# Patient Record
Sex: FEMALE | Race: WHITE | NOT HISPANIC OR LATINO | Employment: OTHER | URBAN - METROPOLITAN AREA
[De-identification: names, ages, dates, MRNs, and addresses within clinical notes are randomized per-mention and may not be internally consistent; named-entity substitution may affect disease eponyms.]

---

## 2017-10-24 RX ORDER — ESTRADIOL 0.05 MG/D
1 FILM, EXTENDED RELEASE TRANSDERMAL WEEKLY
Qty: 4 PATCH | Refills: 2 | Status: SHIPPED | OUTPATIENT
Start: 2017-10-24 | End: 2017-11-06 | Stop reason: SDUPTHER

## 2017-10-24 NOTE — TELEPHONE ENCOUNTER
PT LAST PAP WAS 10/18/16. SHE IS SCHEDULED W [facility] 12/14/17 FOR HER YEARLY. CAN SHE HAVE A REFILL ON HER PATCHES UNTIL HER NEXT APPT. SCANNED REFILL REQ. ESTRADIOL 0.05MG PATCH  PLEASE ADVISE  ALIA

## 2017-10-26 ENCOUNTER — TELEPHONE (OUTPATIENT)
Dept: OBSTETRICS AND GYNECOLOGY | Facility: CLINIC | Age: 52
End: 2017-10-26

## 2017-10-26 NOTE — TELEPHONE ENCOUNTER
----- Message from Jeremy Dumont MD sent at 10/25/2017  4:07 PM EDT -----      ----- Message -----     From: Vicki L Severs, RegSched Rep     Sent: 10/25/2017  11:01 AM       To: Jeremy Dumont MD    [facility]

## 2017-11-06 RX ORDER — ESTRADIOL 0.05 MG/D
1 FILM, EXTENDED RELEASE TRANSDERMAL 2 TIMES WEEKLY
Qty: 4 PATCH | Refills: 2 | Status: CANCELLED | OUTPATIENT
Start: 2017-11-06

## 2017-11-06 RX ORDER — ESTRADIOL 0.05 MG/D
1 FILM, EXTENDED RELEASE TRANSDERMAL 2 TIMES WEEKLY
Qty: 8 PATCH | Refills: 10 | Status: SHIPPED | OUTPATIENT
Start: 2017-11-06 | End: 2018-10-14 | Stop reason: SDUPTHER

## 2017-11-06 NOTE — TELEPHONE ENCOUNTER
Patient called stating recent refill was sent to pharmacy for 1x per week and it should be 2x per week. Patient states she and the pharmacy have reached out twice in the last week. Please advise.

## 2017-12-14 ENCOUNTER — OFFICE VISIT (OUTPATIENT)
Dept: OBSTETRICS AND GYNECOLOGY | Facility: CLINIC | Age: 52
End: 2017-12-14

## 2017-12-14 VITALS
DIASTOLIC BLOOD PRESSURE: 64 MMHG | SYSTOLIC BLOOD PRESSURE: 112 MMHG | WEIGHT: 148.8 LBS | BODY MASS INDEX: 29.21 KG/M2 | HEIGHT: 60 IN

## 2017-12-14 DIAGNOSIS — Z01.419 WELL WOMAN EXAM WITH ROUTINE GYNECOLOGICAL EXAM: Primary | ICD-10-CM

## 2017-12-14 PROCEDURE — 99396 PREV VISIT EST AGE 40-64: CPT | Performed by: NURSE PRACTITIONER

## 2017-12-14 RX ORDER — NAPROXEN 500 MG/1
TABLET ORAL
COMMUNITY
Start: 2017-11-06 | End: 2018-09-01

## 2017-12-14 RX ORDER — OMEGA-3-ACID ETHYL ESTERS 1 G/1
CAPSULE, LIQUID FILLED ORAL
COMMUNITY
Start: 2017-12-05 | End: 2021-05-05

## 2017-12-14 RX ORDER — LISINOPRIL 5 MG/1
TABLET ORAL
COMMUNITY
Start: 2017-10-07 | End: 2018-09-01

## 2017-12-14 RX ORDER — LEVOFLOXACIN 250 MG/1
TABLET ORAL
COMMUNITY
Start: 2017-11-24 | End: 2017-12-14

## 2017-12-14 RX ORDER — FLUTICASONE PROPIONATE 50 MCG
SPRAY, SUSPENSION (ML) NASAL
COMMUNITY
Start: 2017-11-24

## 2017-12-14 RX ORDER — LISINOPRIL 2.5 MG/1
TABLET ORAL
COMMUNITY
Start: 2017-12-05 | End: 2017-12-14

## 2017-12-14 RX ORDER — OSELTAMIVIR PHOSPHATE 75 MG/1
CAPSULE ORAL
COMMUNITY
Start: 2017-11-29 | End: 2017-12-14

## 2017-12-14 NOTE — PROGRESS NOTES
[facility]  Routine Annual Visit    2017    Patient: Gisela Zee          MR#:[id number]      History of Present Illness    52 y.o. female  who presents for annual exam. Former Dr. Browne pt. Last annual 10/2016. Last mammogram benign 10/2017. Hx hysterectomy with bso in  for endometriosis. She has been on estrogen replacement therapy and is doing well on minivelle. She desires to continue for another year. Colonoscopy due in 2 years.  She has not been getting paps since her hysterectomy as no history of abnormals and she is aware of this. She has no complaints today.      No LMP recorded. Patient has had a hysterectomy.  Obstetric History:  OB History      Para Term  AB Living    2 2 2   2    SAB TAB Ectopic Multiple Live Births        2         Menstrual History:     No LMP recorded. Patient has had a hysterectomy.       Sexual History:       ________________________________________  There is no problem list on file for this patient.      Past Medical History:   Diagnosis Date   • Endometriosis    • GERD (gastroesophageal reflux disease)    • Hyperlipidemia    • Hypertension        Past Surgical History:   Procedure Laterality Date   •  SECTION     • CHOLECYSTECTOMY     • HERNIA REPAIR     • HYSTERECTOMY         History   Smoking Status   • Never Smoker   Smokeless Tobacco   • Never Used       Family History   Problem Relation Age of Onset   • Hypertension Mother    • Heart disease Mother    • Diabetes Mother    • Cancer Mother    • Cancer Father    • Hypertension Maternal Grandmother    • Heart disease Maternal Grandmother    • Diabetes Maternal Grandmother    • No Known Problems Sister    • No Known Problems Daughter    • No Known Problems Daughter        Prior to Admission medications    Medication Sig Start Date End Date Taking? Authorizing Provider   estradiol (MINIVELLE) 0.05 MG/24HR patch Place 1 patch on the skin 2 (Two) Times a Week. 17  Yes  Jeremy Dumont MD   fexofenadine (ALLEGRA) 60 MG tablet Take 60 mg by mouth Daily.   Yes Historical Provider, MD   fluticasone (FLONASE) 50 MCG/ACT nasal spray  11/24/17  Yes Historical Provider, MD   lisinopril (PRINIVIL,ZESTRIL) 5 MG tablet  10/7/17  Yes Historical Provider, MD   naproxen (NAPROSYN) 500 MG tablet  11/6/17  Yes Historical Provider, MD   omega-3 acid ethyl esters (LOVAZA) 1 g capsule  12/5/17  Yes Historical Provider, MD   rosuvastatin (CRESTOR) 40 MG tablet  9/30/16  Yes Historical Provider, MD   fenofibrate 160 MG tablet  9/30/16 12/14/17  Historical Provider, MD   levoFLOXacin (LEVAQUIN) 250 MG tablet  11/24/17 12/14/17  Historical Provider, MD   lisinopril (PRINIVIL,ZESTRIL) 2.5 MG tablet  12/5/17 12/14/17  Historical Provider, MD   lisinopril (PRINIVIL,ZESTRIL) 20 MG tablet Take 20 mg by mouth Daily.  12/14/17  Historical Provider, MD   oseltamivir (TAMIFLU) 75 MG capsule  11/29/17 12/14/17  Historical Provider, MD   predniSONE (DELTASONE) 20 MG tablet Take 3 tablets po daily x 5 days. 3/17/17 12/14/17  RONNELL Oneil     ________________________________________    Current contraception: status post hysterectomy  History of abnormal Pap smear: no  Family history of uterine or ovarian cancer: no  Family History of colon cancer/colon polyps: no  History of abnormal mammogram: no  History of abnormal lipids: yes - on medication    The following portions of the patient's history were reviewed and updated as appropriate: allergies, current medications, past family history, past medical history, past social history, past surgical history and problem list.    Review of Systems   Constitutional: Negative.    HENT: Negative.    Eyes: Negative for visual disturbance.   Respiratory: Negative for cough, shortness of breath and wheezing.    Cardiovascular: Negative for chest pain, palpitations and leg swelling.   Gastrointestinal: Negative for abdominal distention, abdominal pain, blood in stool,  "constipation, diarrhea, nausea and vomiting.   Endocrine: Negative for cold intolerance and heat intolerance.   Genitourinary: Negative for difficulty urinating, dyspareunia, dysuria, frequency, genital sores, hematuria, menstrual problem, pelvic pain, urgency, vaginal bleeding, vaginal discharge and vaginal pain.   Musculoskeletal: Negative.    Skin: Negative.    Neurological: Negative for dizziness, weakness, light-headedness, numbness and headaches.   Hematological: Negative.    Psychiatric/Behavioral: Negative.    Breasts: negative for lumps skin changes, dimpling, swelling, nipple changes/discharge bilaterally       Objective   Physical Exam    /64  Ht 152.4 cm (60\")  Wt 67.5 kg (148 lb 12.8 oz)  BMI 29.06 kg/m2   BP Readings from Last 3 Encounters:   12/14/17 112/64   03/17/17 99/62   10/18/16 122/80      Wt Readings from Last 3 Encounters:   12/14/17 67.5 kg (148 lb 12.8 oz)   03/17/17 71.7 kg (158 lb)   10/18/16 73 kg (161 lb)        BMI: Estimated body mass index is 29.06 kg/(m^2) as calculated from the following:    Height as of this encounter: 152.4 cm (60\").    Weight as of this encounter: 67.5 kg (148 lb 12.8 oz).      General:   alert, appears stated age and cooperative   Heart: regular rate and rhythm, S1, S2 normal, no murmur, click, rub or gallop   Lungs: clear to auscultation bilaterally   Abdomen: soft, non-tender, without masses or organomegaly   Breast: inspection negative, no nipple discharge or bleeding, no masses or nodularity palpable   Vulva: normal   Vagina: normal mucosa, normal discharge   Cervix: absent   Uterus: absent    Adnexa: no mass, fullness, tenderness     Assessment:    1. Normal annual exam   2. HRT- counseled on risks, benefits, alternatives.  WHI discussed. Suggest lowest dose for shortest duration. Pt understands the risks and desires to continue her therapy.  3. Healthy lifestyle modifications discussed, counseled on self breast exams and bone " health        Plan:    []  Rx:   []  Mammogram request made  []  PAP done  []  Occult fecal blood test (Insure)  []  Labs:   []  GC/Chl/TV  []  DEXA scan   []  Referral for colonoscopy:           RONNELL Britton  12/14/2017 8:54 AM

## 2018-10-15 RX ORDER — ESTRADIOL 0.05 MG/D
FILM, EXTENDED RELEASE TRANSDERMAL
Qty: 8 PATCH | Refills: 3 | Status: SHIPPED | OUTPATIENT
Start: 2018-10-15 | End: 2018-12-20 | Stop reason: SDUPTHER

## 2018-11-15 ENCOUNTER — TELEPHONE (OUTPATIENT)
Dept: OBSTETRICS AND GYNECOLOGY | Age: 53
End: 2018-11-15

## 2018-11-15 NOTE — TELEPHONE ENCOUNTER
----- Message from Jeremy Dumont MD sent at 11/15/2018  9:57 AM EST -----  Call patient: Normal mammogram

## 2018-12-20 ENCOUNTER — OFFICE VISIT (OUTPATIENT)
Dept: OBSTETRICS AND GYNECOLOGY | Age: 53
End: 2018-12-20

## 2018-12-20 VITALS
HEIGHT: 60 IN | SYSTOLIC BLOOD PRESSURE: 122 MMHG | BODY MASS INDEX: 26.39 KG/M2 | WEIGHT: 134.4 LBS | DIASTOLIC BLOOD PRESSURE: 64 MMHG

## 2018-12-20 DIAGNOSIS — Z01.419 WELL WOMAN EXAM WITH ROUTINE GYNECOLOGICAL EXAM: Primary | ICD-10-CM

## 2018-12-20 PROCEDURE — 99396 PREV VISIT EST AGE 40-64: CPT | Performed by: NURSE PRACTITIONER

## 2018-12-20 RX ORDER — ESTRADIOL 0.05 MG/D
1 FILM, EXTENDED RELEASE TRANSDERMAL 2 TIMES WEEKLY
Qty: 8 PATCH | Refills: 12 | Status: SHIPPED | OUTPATIENT
Start: 2018-12-20 | End: 2019-12-23 | Stop reason: SDUPTHER

## 2018-12-20 NOTE — PROGRESS NOTES
[facility]  Routine Annual Visit    2018    Patient: Gisela Zee          MR#:[id number]      History of Present Illness    53 y.o. female  who presents for annual exam. Hx hysterectomy w/bso. Continues on minivelle 0.05 mg. Requesting refill.  Mammogram negative last month. Colonoscopy due next year per pt. On crestor for hyperlipidemia. She reports normal bone density years ago- plans to repeat next year with mammogram. Sees. Dr. Miranda for primary care. No complaints today. Enjoying 3 grandbabies- youngest 2 months old!      No LMP recorded (lmp unknown). Patient has had a hysterectomy.  Obstetric History:  OB History      Para Term  AB Living    2 2 2     2    SAB TAB Ectopic Molar Multiple Live Births              2         Menstrual History:     No LMP recorded (lmp unknown). Patient has had a hysterectomy.       Sexual History:       ________________________________________  Patient Active Problem List   Diagnosis   • Hypertension   • Hypertension   • Hypertension   • Hypertension   • Hypertension       Past Medical History:   Diagnosis Date   • Endometriosis    • GERD (gastroesophageal reflux disease)    • Hyperlipidemia        Past Surgical History:   Procedure Laterality Date   •  SECTION     • CHOLECYSTECTOMY     • HERNIA REPAIR     • HYSTERECTOMY         Social History     Tobacco Use   Smoking Status Never Smoker   Smokeless Tobacco Never Used       Family History   Problem Relation Age of Onset   • Hypertension Mother    • Heart disease Mother    • Diabetes Mother    • Cancer Mother    • Cancer Father    • Hypertension Maternal Grandmother    • Heart disease Maternal Grandmother    • Diabetes Maternal Grandmother    • No Known Problems Sister    • No Known Problems Daughter    • No Known Problems Daughter        Prior to Admission medications    Medication Sig Start Date End Date Taking? Authorizing Provider   estradiol (MINIVELLE, VIVELLE-DOT) 0.05  MG/24HR patch APPLY 1 PATCH EXTERNALLY TO THE SKIN 2 TIMES A WEEK 10/15/18  Yes Polly Murphy APRN   fexofenadine (ALLEGRA) 60 MG tablet Take 60 mg by mouth Daily.   Yes Provider, MD Brunilda   fluticasone (FLONASE) 50 MCG/ACT nasal spray  11/24/17  Yes Provider, Historical, MD   omega-3 acid ethyl esters (LOVAZA) 1 g capsule  12/5/17  Yes Provider, Historical, MD   rosuvastatin (CRESTOR) 40 MG tablet 5 mg. 9/30/16  Yes Provider, Historical, MD   nitrofurantoin, macrocrystal-monohydrate, (MACROBID) 100 MG capsule Take 1 capsule by mouth 2 (Two) Times a Day. 9/1/18   Nichelle Sykes PA-C   phenazopyridine (PYRIDIUM) 200 MG tablet Take 1 tablet by mouth 3 (Three) Times a Day As Needed for bladder spasms. 9/1/18   Nichelle Sykes PA-C     ________________________________________    The following portions of the patient's history were reviewed and updated as appropriate: allergies, current medications, past family history, past medical history, past social history, past surgical history and problem list.    Review of Systems   Constitutional: Negative.    HENT: Negative.    Eyes: Negative for visual disturbance.   Respiratory: Negative for cough, shortness of breath and wheezing.    Cardiovascular: Negative for chest pain, palpitations and leg swelling.   Gastrointestinal: Negative for abdominal distention, abdominal pain, blood in stool, constipation, diarrhea, nausea and vomiting.   Endocrine: Negative for cold intolerance and heat intolerance.   Genitourinary: Negative for difficulty urinating, dyspareunia, dysuria, frequency, genital sores, hematuria, menstrual problem, pelvic pain, urgency, vaginal bleeding, vaginal discharge and vaginal pain.   Musculoskeletal: Negative.    Skin: Negative.    Neurological: Negative for dizziness, weakness, light-headedness, numbness and headaches.   Hematological: Negative.    Psychiatric/Behavioral: Negative.    Breasts: negative for lumps skin changes, dimpling, swelling,  "nipple changes/discharge bilaterally       Objective   Physical Exam    /64   Ht 152.4 cm (60\")   Wt 61 kg (134 lb 6.4 oz)   LMP  (LMP Unknown)   Breastfeeding? No   BMI 26.25 kg/m²    BP Readings from Last 3 Encounters:   12/20/18 122/64   09/01/18 118/76   06/09/18 124/81      Wt Readings from Last 3 Encounters:   12/20/18 61 kg (134 lb 6.4 oz)   09/01/18 61.7 kg (136 lb)   06/09/18 61.7 kg (136 lb)        BMI: Estimated body mass index is 26.25 kg/m² as calculated from the following:    Height as of this encounter: 152.4 cm (60\").    Weight as of this encounter: 61 kg (134 lb 6.4 oz).            General:   alert, appears stated age and cooperative   Heart: regular rate and rhythm, S1, S2 normal, no murmur, click, rub or gallop   Lungs: clear to auscultation bilaterally   Abdomen: soft, non-tender, without masses or organomegaly   Breast: inspection negative, no nipple discharge or bleeding, no masses or nodularity palpable   Vulva: normal   Vagina: normal mucosa, normal discharge   Cervix: absent   Uterus: absent    Adnexa: no mass, fullness, tenderness     Assessment:    1. Normal annual exam   2. HRT- counseled on risks, benefits, alternatives. Patient has been made aware of the black box warming from FDA about heart attacks, strokes, breast cancer, uterine cancer, and VTE risks. WHI discussed. Suggest lowest dose for shortest duration. Pt understands the risks and desires to continue her therapy.  3. Healthy lifestyle modifications discussed, counseled on self breast exams and bone health      Plan:    []  Rx:   []  Mammogram request made  []  PAP done  []  Occult fecal blood test (Insure)  []  Labs:   []  GC/Chl/TV  []  DEXA scan   []  Referral for colonoscopy:           RONNELL Britton  12/20/2018 11:37 AM  "

## 2019-11-20 ENCOUNTER — TELEPHONE (OUTPATIENT)
Dept: OBSTETRICS AND GYNECOLOGY | Age: 54
End: 2019-11-20

## 2019-11-20 NOTE — TELEPHONE ENCOUNTER
----- Message from Jeremy Dumont MD sent at 11/20/2019  2:31 PM EST -----  Call patient: Normal mammogram

## 2019-12-23 ENCOUNTER — OFFICE VISIT (OUTPATIENT)
Dept: OBSTETRICS AND GYNECOLOGY | Age: 54
End: 2019-12-23

## 2019-12-23 VITALS
SYSTOLIC BLOOD PRESSURE: 110 MMHG | WEIGHT: 138 LBS | HEIGHT: 60 IN | BODY MASS INDEX: 27.09 KG/M2 | DIASTOLIC BLOOD PRESSURE: 70 MMHG

## 2019-12-23 DIAGNOSIS — Z01.419 WELL WOMAN EXAM WITH ROUTINE GYNECOLOGICAL EXAM: Primary | ICD-10-CM

## 2019-12-23 PROCEDURE — 99396 PREV VISIT EST AGE 40-64: CPT | Performed by: NURSE PRACTITIONER

## 2019-12-23 RX ORDER — ESTRADIOL 0.05 MG/D
1 FILM, EXTENDED RELEASE TRANSDERMAL 2 TIMES WEEKLY
Qty: 8 PATCH | Refills: 12 | Status: SHIPPED | OUTPATIENT
Start: 2019-12-23 | End: 2020-01-02

## 2019-12-23 RX ORDER — ROSUVASTATIN CALCIUM 5 MG/1
10 TABLET, COATED ORAL DAILY
COMMUNITY

## 2019-12-23 NOTE — PROGRESS NOTES
Subjective     Chief Complaint   Patient presents with   • Gynecologic Exam     Annual exam. MG last month at [facility].       History of Present Illness    Gisela Zee is a 54 y.o. female who presents for annual exam. Mammogram negative 2019. Reports current on colonoscopy- repeat due next year she reports. DEXA completed in October- ordered and managed by her PCP, Dr. Miranda. Normal Gisela reports.     Gisela denies any changes or concerns today. Continues on minivelle and requests refill.    Obstetric History:  OB History        2    Para   2    Term   2            AB        Living   2       SAB        TAB        Ectopic        Molar        Multiple        Live Births   2               Menstrual History:     No LMP recorded (lmp unknown). Patient has had a hysterectomy.         Mammogram: up to date.  Colonoscopy: up to date.  DEXA: up to date.  Last Pap:    Social History    Tobacco Use      Smoking status: Never Smoker      Smokeless tobacco: Never Used    Exercise: moderately active  Calcium/Vitamin D: adequate intake    The following portions of the patient's history were reviewed and updated as appropriate: allergies, current medications, past family history, past medical history, past social history, past surgical history and problem list.    Review of Systems   Constitutional: Negative.    HENT: Negative.    Eyes: Negative for visual disturbance.   Respiratory: Negative for cough, shortness of breath and wheezing.    Cardiovascular: Negative for chest pain, palpitations and leg swelling.   Gastrointestinal: Negative for abdominal distention, abdominal pain, blood in stool, constipation, diarrhea, nausea and vomiting.   Endocrine: Negative for cold intolerance and heat intolerance.   Genitourinary: Negative for difficulty urinating, dyspareunia, dysuria, frequency, genital sores, hematuria, menstrual problem, pelvic pain, urgency, vaginal bleeding, vaginal discharge and vaginal pain.  "  Musculoskeletal: Negative.    Skin: Negative.    Neurological: Negative for dizziness, weakness, light-headedness, numbness and headaches.   Hematological: Negative.    Psychiatric/Behavioral: Negative.    Breasts: negative for lumps skin changes, dimpling, swelling, nipple changes/discharge bilaterally       Objective   Physical Exam    /70   Ht 152.4 cm (60\")   Wt 62.6 kg (138 lb)   LMP  (LMP Unknown)   BMI 26.95 kg/m²     General:   alert, appears stated age and cooperative   Neck:    Heart: regular rate and rhythm, S1, S2 normal, no murmur, click, rub or gallop   Lungs: clear to auscultation bilaterally   Abdomen: soft, non-tender, without masses or organomegaly   Breast: inspection negative, no nipple discharge or bleeding, no masses or nodularity palpable   Vulva: normal   Vagina: normal mucosa, normal discharge   Cervix: absent   Uterus: absent   Adnexa: no mass, fullness, tenderness         Assessment/Plan   Gisela was seen today for gynecologic exam.    Diagnoses and all orders for this visit:    Well woman exam with routine gynecological exam        All questions answered.  Breast self exam technique reviewed and patient encouraged to perform self-exam monthly.  Discussed healthy lifestyle modifications.   All of the patient's questions were addressed and answered, I have encouraged her to call for today's test results if she has not received them within 10 days.  Patient is advised to call with any change in her condition or with any other questions, otherwise return in 12 months for annual examination.      Polly Murphy, RONNELL             "

## 2020-01-02 RX ORDER — ESTRADIOL 0.05 MG/D
FILM, EXTENDED RELEASE TRANSDERMAL
Qty: 8 PATCH | Refills: 12 | Status: SHIPPED | OUTPATIENT
Start: 2020-01-02 | End: 2021-01-11 | Stop reason: SDUPTHER

## 2020-09-24 ENCOUNTER — TELEPHONE (OUTPATIENT)
Dept: GASTROENTEROLOGY | Facility: CLINIC | Age: 55
End: 2020-09-24

## 2020-09-25 ENCOUNTER — PREP FOR SURGERY (OUTPATIENT)
Dept: OTHER | Facility: HOSPITAL | Age: 55
End: 2020-09-25

## 2020-09-25 DIAGNOSIS — Z12.11 ENCOUNTER FOR SCREENING FOR MALIGNANT NEOPLASM OF COLON: Primary | ICD-10-CM

## 2020-09-30 NOTE — TELEPHONE ENCOUNTER
CALLED AND SPOKE WITH PATIENT.  SCHEDULED 12/18/2020 AT 2PM - ARRIVE 1PM.  E-MAIL INSTRUCTIONS [email or web address].    COVID TEST ON 12/16/2020 ARRIVE 9AM AT [facility].  NEED TO SELF QUARANTINE  UNTIL AFTER PROCEDURE.  SHE UNDERSTANDS.

## 2020-12-02 ENCOUNTER — TELEPHONE (OUTPATIENT)
Dept: GASTROENTEROLOGY | Facility: CLINIC | Age: 55
End: 2020-12-02

## 2020-12-02 NOTE — TELEPHONE ENCOUNTER
CALL FROM PATIENT.  SCHEDULED FOR COLONOSCOPY 12/18/2020, NEEDS TO RESCHEDULE.   IS HAVING BACK SURGERY.  RESCHEDULE TO [facility] 03/05/2021 AT 12:30PM - ARRIVE 11:30AM.  E-MAIL INSTRUCTIONS [email or web address].    COVID TEST ON 03/03/2021 ARRIVE 8AM AT [place].  NEED TO SELF QUARANTINE UNTIL AFTER PROCEDURE.  SHE UNDERSTANDS.

## 2021-01-11 ENCOUNTER — OFFICE VISIT (OUTPATIENT)
Dept: OBSTETRICS AND GYNECOLOGY | Age: 56
End: 2021-01-11

## 2021-01-11 VITALS
BODY MASS INDEX: 23.73 KG/M2 | SYSTOLIC BLOOD PRESSURE: 124 MMHG | HEIGHT: 64 IN | WEIGHT: 139 LBS | DIASTOLIC BLOOD PRESSURE: 76 MMHG

## 2021-01-11 DIAGNOSIS — Z01.419 WELL WOMAN EXAM WITH ROUTINE GYNECOLOGICAL EXAM: Primary | ICD-10-CM

## 2021-01-11 DIAGNOSIS — Z12.31 SCREENING MAMMOGRAM, ENCOUNTER FOR: ICD-10-CM

## 2021-01-11 PROCEDURE — 99396 PREV VISIT EST AGE 40-64: CPT | Performed by: NURSE PRACTITIONER

## 2021-01-11 RX ORDER — ESTRADIOL 0.05 MG/D
1 FILM, EXTENDED RELEASE TRANSDERMAL 2 TIMES WEEKLY
Qty: 8 PATCH | Refills: 12 | Status: SHIPPED | OUTPATIENT
Start: 2021-01-11 | End: 2022-01-17 | Stop reason: SDUPTHER

## 2021-01-11 RX ORDER — MONTELUKAST SODIUM 10 MG/1
TABLET ORAL
COMMUNITY
Start: 2020-04-01 | End: 2023-01-19

## 2021-01-11 RX ORDER — MULTIPLE VITAMINS W/ MINERALS TAB 9MG-400MCG
TAB ORAL
COMMUNITY
Start: 2017-01-01

## 2021-01-11 NOTE — PROGRESS NOTES
[facility]  Routine Annual Visit    2021    Patient: Gisela Zee          MR#:[id number]      History of Present Illness    55 y.o. female  who presents for annual exam.    Hx hysterectomy  Mammogram negative 2019  Colonoscopy scheduled in 2 months  She continues on low dose minivelle for vasomotor sxs - desires to continue therapy   DEXA- reports current and normal. Ordered by Dr. Miranda    No complaints today  Home schooling 3 grandsons during COVID    No LMP recorded (lmp unknown). Patient has had a hysterectomy.  Obstetric History:  OB History        2    Para   2    Term   2            AB        Living   2       SAB        TAB        Ectopic        Molar        Multiple        Live Births   2               Menstrual History:     No LMP recorded (lmp unknown). Patient has had a hysterectomy.       Sexual History:       ________________________________________  Patient Active Problem List   Diagnosis   (none) - all problems resolved or deleted       Past Medical History:   Diagnosis Date   • Endometriosis    • GERD (gastroesophageal reflux disease)    • Hyperlipidemia    • Hypertension        Past Surgical History:   Procedure Laterality Date   •  SECTION     • CHOLECYSTECTOMY     • COLON RESECTION      diverticulitis    • HERNIA REPAIR     • HYSTERECTOMY         Social History     Tobacco Use   Smoking Status Never Smoker   Smokeless Tobacco Never Used       Family History   Problem Relation Age of Onset   • Hypertension Mother    • Heart disease Mother    • Diabetes Mother    • Cancer Mother    • Cancer Father    • Hypertension Maternal Grandmother    • Heart disease Maternal Grandmother    • Diabetes Maternal Grandmother    • Multiple sclerosis Sister    • No Known Problems Daughter    • No Known Problems Daughter        Prior to Admission medications    Medication Sig Start Date End Date Taking? Authorizing Provider   ALBUTEROL SULFATE HFA IN   10/9/20  Yes Provider, MD Brunilda   estradiol (MINIVELLE, VIVELLE-DOT) 0.05 MG/24HR patch APPLY 1 PATCH TO SKIN TWICE A WEEK 1/2/20  Yes Polly Murphy APRN   fexofenadine (ALLEGRA) 60 MG tablet Take 60 mg by mouth Daily.   Yes Provider, MD Brunilda   fluticasone (FLONASE) 50 MCG/ACT nasal spray  11/24/17  Yes ProviderBrunilda MD   montelukast (SINGULAIR) 10 MG tablet  4/1/20  Yes Provider, MD Brunilda   multivitamin with minerals (Multivitamin Adults 50+) tablet tablet  1/1/17  Yes Provider, MD Brunilda   omega-3 acid ethyl esters (LOVAZA) 1 g capsule  12/5/17  Yes Provider, MD Brunilda   rosuvastatin (CRESTOR) 5 MG tablet Take 5 mg by mouth Daily.   Yes Provider, MD Brunilda   ciprofloxacin (Cipro) 500 MG tablet Take 1 tablet by mouth 2 (Two) Times a Day. 9/24/20   Nichelle Sykes PA-C   metroNIDAZOLE (FLAGYL) 500 MG tablet Take 1 tablet by mouth 3 (Three) Times a Day. 9/24/20   Nichelle Sykes PA-C     ________________________________________    The following portions of the patient's history were reviewed and updated as appropriate: allergies, current medications, past family history, past medical history, past social history, past surgical history and problem list.    Review of Systems   Constitutional: Negative.    HENT: Negative.    Eyes: Negative for visual disturbance.   Respiratory: Negative for cough, shortness of breath and wheezing.    Cardiovascular: Negative for chest pain, palpitations and leg swelling.   Gastrointestinal: Negative for abdominal distention, abdominal pain, blood in stool, constipation, diarrhea, nausea and vomiting.   Endocrine: Negative for cold intolerance and heat intolerance.   Genitourinary: Negative for difficulty urinating, dyspareunia, dysuria, frequency, genital sores, hematuria, menstrual problem, pelvic pain, urgency, vaginal bleeding, vaginal discharge and vaginal pain.   Musculoskeletal: Negative.    Skin: Negative.    Neurological: Negative for  "dizziness, weakness, light-headedness, numbness and headaches.   Hematological: Negative.    Psychiatric/Behavioral: Negative.    Breasts: negative for lumps skin changes, dimpling, swelling, nipple changes/discharge bilaterally           Objective   Physical Exam    /76   Ht 162.6 cm (64\")   Wt 63 kg (139 lb)   LMP  (LMP Unknown)   Breastfeeding No   BMI 23.86 kg/m²    BP Readings from Last 3 Encounters:   01/11/21 124/76   09/24/20 139/90   12/23/19 110/70      Wt Readings from Last 3 Encounters:   01/11/21 63 kg (139 lb)   09/24/20 61.2 kg (135 lb)   12/23/19 62.6 kg (138 lb)        BMI: Estimated body mass index is 23.86 kg/m² as calculated from the following:    Height as of this encounter: 162.6 cm (64\").    Weight as of this encounter: 63 kg (139 lb).            General:   alert, appears stated age and cooperative   Heart: regular rate and rhythm, S1, S2 normal, no murmur, click, rub or gallop   Lungs: clear to auscultation bilaterally   Abdomen: soft, non-tender, without masses or organomegaly   Breast: inspection negative, no nipple discharge or bleeding, no masses or nodularity palpable   Vulva: External genitalia including bartholin's glands, Urethra, [place]'s gland and urethra meatus are normal, Perineum, rectum and anus appear normal  and Bladder appears normal without significant prolapse    Vagina: normal mucosa, normal discharge   Cervix: absent   Uterus: absent    Adnexa: no mass, fullness, tenderness     Assessment:    1. Normal annual exam   2. HRT - Patient has been made aware of the black box warming from FDA about heart attacks, strokes, breast cancer, uterine cancer, and VTE risks  3. Healthy lifestyle modifications discussed, counseled on self breast exams and bone health    All of the patient's questions were addressed and answered, I have encouraged her to call for today's test results if she has not received them within 10 days.  Patient is advised to call with any change in her " condition or with any other questions, otherwise return in 12 months for annual examination.      Plan:    []  Rx:   [x]  Mammogram request made  [x]  PAP done  []  Occult fecal blood test (Insure)  []  Labs:   []  GC/Chl/TV  []  DEXA scan   []  Referral for colonoscopy:           Polly Murphy, RONNELL  1/11/2021 09:27 EST

## 2021-01-14 LAB
CYTOLOGIST CVX/VAG CYTO: NORMAL
CYTOLOGY CVX/VAG DOC CYTO: NORMAL
CYTOLOGY CVX/VAG DOC THIN PREP: NORMAL
DX ICD CODE: NORMAL
HIV 1 & 2 AB SER-IMP: NORMAL
HPV I/H RISK 4 DNA CVX QL PROBE+SIG AMP: NEGATIVE
Lab: NORMAL
OTHER STN SPEC: NORMAL
STAT OF ADQ CVX/VAG CYTO-IMP: NORMAL

## 2021-01-28 ENCOUNTER — TRANSCRIBE ORDERS (OUTPATIENT)
Dept: LAB | Facility: SURGERY CENTER | Age: 56
End: 2021-01-28

## 2021-01-28 DIAGNOSIS — Z01.818 OTHER SPECIFIED PRE-OPERATIVE EXAMINATION: Primary | ICD-10-CM

## 2021-02-08 ENCOUNTER — TELEPHONE (OUTPATIENT)
Dept: GASTROENTEROLOGY | Facility: CLINIC | Age: 56
End: 2021-02-08

## 2021-02-08 NOTE — TELEPHONE ENCOUNTER
CALL FROM PATIENT.  SCHEDULED FOR COLONOSCOPY ON 03/05/2021.   WILL BE HAVING SURGERY AND NEED TO RESCHEDULE HER PROCEDURE.    SCHEDULED AT [facility] 05/07/2021 AT 3PM - ARRIVE 2PM.  E-MAIL [email or web address].    COVID TEST ON 05/05/2021 ARR[facility] 8AM AT [facility].  NEED TO SELF QUARANTINE UNTIL AFTER PROCEDURE.  SHE UNDERSTANDS.

## 2021-03-03 ENCOUNTER — APPOINTMENT (OUTPATIENT)
Dept: LAB | Facility: SURGERY CENTER | Age: 56
End: 2021-03-03

## 2021-03-15 ENCOUNTER — HOSPITAL ENCOUNTER (OUTPATIENT)
Dept: MAMMOGRAPHY | Facility: HOSPITAL | Age: 56
Discharge: HOME | End: 2021-03-15
Admitting: NURSE PRACTITIONER

## 2021-03-15 DIAGNOSIS — Z12.31 SCREENING MAMMOGRAM, ENCOUNTER FOR: ICD-10-CM

## 2021-03-15 PROCEDURE — 77063 BREAST TOMOSYNTHESIS BI: CPT

## 2021-03-15 PROCEDURE — 77067 SCR MAMMO BI INCL CAD: CPT

## 2021-03-30 ENCOUNTER — BULK ORDERING (OUTPATIENT)
Dept: CASE MANAGEMENT | Facility: OTHER | Age: 56
End: 2021-03-30

## 2021-03-30 DIAGNOSIS — Z23 IMMUNIZATION DUE: ICD-10-CM

## 2021-04-09 ENCOUNTER — PREP FOR SURGERY (OUTPATIENT)
Dept: SURGERY | Facility: SURGERY CENTER | Age: 56
End: 2021-04-09

## 2021-04-09 DIAGNOSIS — Z12.11 ENCOUNTER FOR SCREENING FOR MALIGNANT NEOPLASM OF COLON: Primary | ICD-10-CM

## 2021-05-05 ENCOUNTER — LAB (OUTPATIENT)
Dept: LAB | Facility: SURGERY CENTER | Age: 56
End: 2021-05-05

## 2021-05-05 ENCOUNTER — TRANSCRIBE ORDERS (OUTPATIENT)
Dept: LAB | Facility: SURGERY CENTER | Age: 56
End: 2021-05-05

## 2021-05-05 DIAGNOSIS — Z01.818 OTHER SPECIFIED PRE-OPERATIVE EXAMINATION: Primary | ICD-10-CM

## 2021-05-05 DIAGNOSIS — Z01.818 OTHER SPECIFIED PRE-OPERATIVE EXAMINATION: ICD-10-CM

## 2021-05-05 LAB — SARS-COV-2 ORF1AB RESP QL NAA+PROBE: NOT DETECTED

## 2021-05-05 PROCEDURE — C9803 HOPD COVID-19 SPEC COLLECT: HCPCS

## 2021-05-05 PROCEDURE — U0004 COV-19 TEST NON-CDC HGH THRU: HCPCS | Performed by: SURGERY

## 2021-05-07 ENCOUNTER — HOSPITAL ENCOUNTER (OUTPATIENT)
Facility: SURGERY CENTER | Age: 56
Setting detail: HOSPITAL OUTPATIENT SURGERY
Discharge: HOME | End: 2021-05-07
Attending: INTERNAL MEDICINE | Admitting: INTERNAL MEDICINE

## 2021-05-07 ENCOUNTER — ANESTHESIA (OUTPATIENT)
Dept: SURGERY | Facility: SURGERY CENTER | Age: 56
End: 2021-05-07

## 2021-05-07 ENCOUNTER — ANESTHESIA EVENT (OUTPATIENT)
Dept: SURGERY | Facility: SURGERY CENTER | Age: 56
End: 2021-05-07

## 2021-05-07 VITALS
HEIGHT: 60 IN | BODY MASS INDEX: 26.88 KG/M2 | WEIGHT: 136.9 LBS | OXYGEN SATURATION: 98 % | SYSTOLIC BLOOD PRESSURE: 108 MMHG | DIASTOLIC BLOOD PRESSURE: 67 MMHG | HEART RATE: 84 BPM | TEMPERATURE: 97.1 F | RESPIRATION RATE: 20 BRPM

## 2021-05-07 DIAGNOSIS — Z12.11 ENCOUNTER FOR SCREENING FOR MALIGNANT NEOPLASM OF COLON: ICD-10-CM

## 2021-05-07 PROCEDURE — 45380 COLONOSCOPY AND BIOPSY: CPT | Performed by: INTERNAL MEDICINE

## 2021-05-07 PROCEDURE — 25010000002 PROPOFOL 10 MG/ML EMULSION: Performed by: ANESTHESIOLOGY

## 2021-05-07 PROCEDURE — 0DBK8ZX EXCISION OF ASCENDING COLON, VIA NATURAL OR ARTIFICIAL OPENING ENDOSCOPIC, DIAGNOSTIC: ICD-10-PCS | Performed by: INTERNAL MEDICINE

## 2021-05-07 PROCEDURE — 0DBL8ZX EXCISION OF TRANSVERSE COLON, VIA NATURAL OR ARTIFICIAL OPENING ENDOSCOPIC, DIAGNOSTIC: ICD-10-PCS | Performed by: INTERNAL MEDICINE

## 2021-05-07 PROCEDURE — 88305 TISSUE EXAM BY PATHOLOGIST: CPT | Performed by: INTERNAL MEDICINE

## 2021-05-07 RX ORDER — MAGNESIUM HYDROXIDE 1200 MG/15ML
LIQUID ORAL AS NEEDED
Status: DISCONTINUED | OUTPATIENT
Start: 2021-05-07 | End: 2021-05-07 | Stop reason: HOSPADM

## 2021-05-07 RX ORDER — PROPOFOL 10 MG/ML
VIAL (ML) INTRAVENOUS AS NEEDED
Status: DISCONTINUED | OUTPATIENT
Start: 2021-05-07 | End: 2021-05-07 | Stop reason: SURG

## 2021-05-07 RX ORDER — SODIUM CHLORIDE 0.9 % (FLUSH) 0.9 %
10 SYRINGE (ML) INJECTION AS NEEDED
Status: DISCONTINUED | OUTPATIENT
Start: 2021-05-07 | End: 2021-05-07 | Stop reason: HOSPADM

## 2021-05-07 RX ORDER — SODIUM CHLORIDE, SODIUM LACTATE, POTASSIUM CHLORIDE, CALCIUM CHLORIDE 600; 310; 30; 20 MG/100ML; MG/100ML; MG/100ML; MG/100ML
1000 INJECTION, SOLUTION INTRAVENOUS CONTINUOUS
Status: DISCONTINUED | OUTPATIENT
Start: 2021-05-07 | End: 2021-05-07 | Stop reason: HOSPADM

## 2021-05-07 RX ORDER — SODIUM CHLORIDE, SODIUM LACTATE, POTASSIUM CHLORIDE, CALCIUM CHLORIDE 600; 310; 30; 20 MG/100ML; MG/100ML; MG/100ML; MG/100ML
INJECTION, SOLUTION INTRAVENOUS CONTINUOUS PRN
Status: DISCONTINUED | OUTPATIENT
Start: 2021-05-07 | End: 2021-05-07 | Stop reason: SURG

## 2021-05-07 RX ORDER — LIDOCAINE HYDROCHLORIDE 20 MG/ML
INJECTION, SOLUTION INFILTRATION; PERINEURAL AS NEEDED
Status: DISCONTINUED | OUTPATIENT
Start: 2021-05-07 | End: 2021-05-07 | Stop reason: SURG

## 2021-05-07 RX ORDER — LIDOCAINE HYDROCHLORIDE 10 MG/ML
0.5 INJECTION, SOLUTION INFILTRATION; PERINEURAL ONCE AS NEEDED
Status: DISCONTINUED | OUTPATIENT
Start: 2021-05-07 | End: 2021-05-07 | Stop reason: HOSPADM

## 2021-05-07 RX ADMIN — PROPOFOL 80 MG: 10 INJECTION, EMULSION INTRAVENOUS at 14:35

## 2021-05-07 RX ADMIN — SODIUM CHLORIDE, POTASSIUM CHLORIDE, SODIUM LACTATE AND CALCIUM CHLORIDE 1000 ML: 600; 310; 30; 20 INJECTION, SOLUTION INTRAVENOUS at 13:18

## 2021-05-07 RX ADMIN — SODIUM CHLORIDE, SODIUM LACTATE, POTASSIUM CHLORIDE, AND CALCIUM CHLORIDE: .6; .31; .03; .02 INJECTION, SOLUTION INTRAVENOUS at 14:30

## 2021-05-07 RX ADMIN — LIDOCAINE HYDROCHLORIDE 60 MG: 20 INJECTION, SOLUTION INFILTRATION; PERINEURAL at 14:33

## 2021-05-07 RX ADMIN — PROPOFOL 160 MCG/KG/MIN: 10 INJECTION, EMULSION INTRAVENOUS at 14:35

## 2021-05-07 NOTE — ANESTHESIA POSTPROCEDURE EVALUATION
"Patient: Gisela Zee    Procedure Summary     Date: 05/07/21 Room / Location: [facility]    Anesthesia Start: 1430 Anesthesia Stop: 1501    Procedure: COLONOSCOPY WITH DIVERTICULOSIS AND POLYPS (N/A ) Diagnosis:       Encounter for screening for malignant neoplasm of colon      Colon polyp      Diverticulosis large intestine w/o perforation or abscess w/bleeding      (Encounter for screening for malignant neoplasm of colon [Z12.11])    Surgeons: Enrique Lopez MD Provider: Carol Hairston MD    Anesthesia Type: MAC ASA Status: 2          Anesthesia Type: MAC    Vitals  Vitals Value Taken Time   BP 97/55 05/07/21 1503   Temp 36.2 °C (97.1 °F) 05/07/21 1501   Pulse 72 05/07/21 1503   Resp 18 05/07/21 1503   SpO2 95 % 05/07/21 1503           Post Anesthesia Care and Evaluation    Patient location during evaluation: bedside  Patient participation: complete - patient participated  Level of consciousness: awake  Pain management: adequate  Airway patency: patent  Anesthetic complications: No anesthetic complications    Cardiovascular status: acceptable  Respiratory status: acceptable  Hydration status: acceptable    Comments: BP 97/55 (BP Location: Left arm, Patient Position: Lying)   Pulse 72   Temp 36.2 °C (97.1 °F) (Temporal)   Resp 18   Ht 152.4 cm (60\")   Wt 62.1 kg (136 lb 14.4 oz)   LMP  (LMP Unknown)   SpO2 95%   BMI 26.74 kg/m²       "

## 2021-05-07 NOTE — ANESTHESIA PREPROCEDURE EVALUATION
Anesthesia Evaluation     NPO Solid Status: > 8 hours  NPO Liquid Status: > 2 hours           Airway   Mallampati: II  TM distance: >3 FB  Neck ROM: full  No difficulty expected  Dental - normal exam     Pulmonary - normal exam   (+) asthma,    ROS comment: Seasonal allergies   Cardiovascular - normal exam  Exercise tolerance: good (4-7 METS)    (+) hypertension well controlled less than 2 medications, hyperlipidemia,       Neuro/Psych  GI/Hepatic/Renal/Endo    (+)  GERD well controlled,      Musculoskeletal     Abdominal    Substance History      OB/GYN          Other                      Anesthesia Plan    ASA 2     MAC     intravenous induction     Anesthetic plan, all risks, benefits, and alternatives have been provided, discussed and informed consent has been obtained with: patient.

## 2021-05-10 LAB
CYTO UR: NORMAL
LAB AP CASE REPORT: NORMAL
LAB AP CLINICAL INFORMATION: NORMAL
PATH REPORT.FINAL DX SPEC: NORMAL
PATH REPORT.GROSS SPEC: NORMAL

## 2022-01-17 ENCOUNTER — OFFICE VISIT (OUTPATIENT)
Dept: OBSTETRICS AND GYNECOLOGY | Age: 57
End: 2022-01-17

## 2022-01-17 VITALS
WEIGHT: 141.4 LBS | BODY MASS INDEX: 27.76 KG/M2 | DIASTOLIC BLOOD PRESSURE: 76 MMHG | HEIGHT: 60 IN | SYSTOLIC BLOOD PRESSURE: 118 MMHG

## 2022-01-17 DIAGNOSIS — Z12.31 SCREENING MAMMOGRAM FOR BREAST CANCER: ICD-10-CM

## 2022-01-17 DIAGNOSIS — Z01.419 WELL WOMAN EXAM WITH ROUTINE GYNECOLOGICAL EXAM: Primary | ICD-10-CM

## 2022-01-17 PROCEDURE — 99396 PREV VISIT EST AGE 40-64: CPT | Performed by: NURSE PRACTITIONER

## 2022-01-17 RX ORDER — ELETRIPTAN HYDROBROMIDE 20 MG/1
TABLET, FILM COATED ORAL
COMMUNITY

## 2022-01-17 RX ORDER — LISINOPRIL 2.5 MG/1
TABLET ORAL
COMMUNITY
Start: 2021-09-24 | End: 2023-01-19

## 2022-01-17 RX ORDER — ESTRADIOL 0.05 MG/D
1 FILM, EXTENDED RELEASE TRANSDERMAL 2 TIMES WEEKLY
Qty: 8 PATCH | Refills: 12 | Status: SHIPPED | OUTPATIENT
Start: 2022-01-17 | End: 2023-01-19 | Stop reason: SDUPTHER

## 2022-01-19 LAB
CYTOLOGIST CVX/VAG CYTO: NORMAL
CYTOLOGY CVX/VAG DOC CYTO: NORMAL
CYTOLOGY CVX/VAG DOC THIN PREP: NORMAL
DX ICD CODE: NORMAL
HIV 1 & 2 AB SER-IMP: NORMAL
HPV I/H RISK 4 DNA CVX QL PROBE+SIG AMP: NEGATIVE
OTHER STN SPEC: NORMAL
STAT OF ADQ CVX/VAG CYTO-IMP: NORMAL

## 2022-03-18 ENCOUNTER — HOSPITAL ENCOUNTER (OUTPATIENT)
Dept: MAMMOGRAPHY | Facility: HOSPITAL | Age: 57
Discharge: HOME | End: 2022-03-18
Admitting: NURSE PRACTITIONER

## 2022-03-18 DIAGNOSIS — Z12.31 SCREENING MAMMOGRAM FOR BREAST CANCER: ICD-10-CM

## 2022-03-18 PROCEDURE — 77067 SCR MAMMO BI INCL CAD: CPT

## 2022-03-18 PROCEDURE — 77063 BREAST TOMOSYNTHESIS BI: CPT

## 2023-01-19 ENCOUNTER — OFFICE VISIT (OUTPATIENT)
Dept: OBSTETRICS AND GYNECOLOGY | Age: 58
End: 2023-01-19
Payer: COMMERCIAL

## 2023-01-19 VITALS
HEIGHT: 60 IN | DIASTOLIC BLOOD PRESSURE: 78 MMHG | BODY MASS INDEX: 27.56 KG/M2 | SYSTOLIC BLOOD PRESSURE: 126 MMHG | WEIGHT: 140.4 LBS

## 2023-01-19 DIAGNOSIS — Z01.419 WELL WOMAN EXAM WITH ROUTINE GYNECOLOGICAL EXAM: Primary | ICD-10-CM

## 2023-01-19 PROBLEM — I95.1 ORTHOSTATIC HYPOTENSION: Status: ACTIVE | Noted: 2019-02-06

## 2023-01-19 PROBLEM — E66.3 OVERWEIGHT: Status: ACTIVE | Noted: 2017-10-11

## 2023-01-19 PROBLEM — M19.041 OSTEOARTHRITIS OF BOTH HANDS: Status: ACTIVE | Noted: 2019-08-14

## 2023-01-19 PROBLEM — Z78.9 NON-SMOKER: Status: ACTIVE | Noted: 2017-08-22

## 2023-01-19 PROBLEM — K57.30 DIVERTICULOSIS OF COLON: Status: ACTIVE | Noted: 2021-10-12

## 2023-01-19 PROBLEM — M19.90 ARTHRITIS: Status: ACTIVE | Noted: 2019-10-02

## 2023-01-19 PROBLEM — J02.0 STREPTOCOCCAL SORE THROAT: Status: ACTIVE | Noted: 2022-01-18

## 2023-01-19 PROBLEM — M19.042 OSTEOARTHRITIS OF BOTH HANDS: Status: ACTIVE | Noted: 2019-08-14

## 2023-01-19 PROBLEM — Z78.0 MENOPAUSE: Status: ACTIVE | Noted: 2018-02-26

## 2023-01-19 PROBLEM — E78.6 HIGH-DENSITY LIPOPROTEIN DEFICIENCY: Status: ACTIVE | Noted: 2017-02-03

## 2023-01-19 PROBLEM — E78.5 HYPERLIPIDEMIA: Status: ACTIVE | Noted: 2020-03-31

## 2023-01-19 PROCEDURE — 99396 PREV VISIT EST AGE 40-64: CPT | Performed by: NURSE PRACTITIONER

## 2023-01-19 RX ORDER — ESTRADIOL 0.05 MG/D
1 FILM, EXTENDED RELEASE TRANSDERMAL 2 TIMES WEEKLY
Qty: 8 PATCH | Refills: 12 | Status: SHIPPED | OUTPATIENT
Start: 2023-01-19

## 2023-01-19 NOTE — PROGRESS NOTES
[facility]  Routine Annual Visit    2023    Patient: Gisela Zee          MR#:[id number]      History of Present Illness    57 y.o. female  who presents for annual exam with no complaints and no change in health history.     Desires to continue on minivelle- notices if late changing a patch. R/B/A discussed.    No LMP recorded (lmp unknown). Patient has had a hysterectomy.  Obstetric History:  OB History        2    Para   2    Term   2            AB        Living   2       SAB        IAB        Ectopic        Molar        Multiple        Live Births   2               Menstrual History:     No LMP recorded (lmp unknown). Patient has had a hysterectomy.       Sexual History:       ________________________________________  Patient Active Problem List   Diagnosis   • Allergic rhinitis   • Arthritis   • Diverticulosis of colon   • Herpes labialis   • High-density lipoprotein deficiency   • Histoplasmosis with retinitis   • Hyperlipidemia   • Menopause   • Migraine   • Motion sickness   • Non-smoker   • Orthostatic hypotension   • Osteoarthritis of both hands   • Overweight   • Polyp of colon   • Steatosis of liver       Past Medical History:   Diagnosis Date   • Asthma    • Endometriosis    • GERD (gastroesophageal reflux disease)    • Hyperlipidemia    • Hypertension        Past Surgical History:   Procedure Laterality Date   •  SECTION     • CHOLECYSTECTOMY     • COLON RESECTION  1996    diverticulitis    • COLONOSCOPY     • COLONOSCOPY N/A 2021    Procedure: COLONOSCOPY WITH DIVERTICULOSIS AND POLYPS;  Surgeon: Enrique Lopez MD;  [place] [facility] [place]  Service: Gastroenterology;  Laterality: N/A;   • HERNIA REPAIR     • HYSTERECTOMY     • OOPHORECTOMY         Social History     Tobacco Use   Smoking Status Never   Smokeless Tobacco Never       Family History   Problem Relation Age of Onset   • Hypertension Mother    • Heart disease Mother     • Diabetes Mother    • Cancer Mother    • Cancer Father    • Hypertension Maternal Grandmother    • Heart disease Maternal Grandmother    • Diabetes Maternal Grandmother    • Multiple sclerosis Sister    • No Known Problems Daughter    • No Known Problems Daughter        Prior to Admission medications    Medication Sig Start Date End Date Taking? Authorizing Provider   ALBUTEROL SULFATE HFA IN  10/9/20  Yes Brunilda Suresh MD   eletriptan (RELPAX) 20 MG tablet Take  by mouth.   Yes ProviderBrunilda MD   estradiol (MINIVELLE, VIVELLE-DOT) 0.05 MG/24HR patch Place 1 patch on the skin as directed by provider 2 (Two) Times a Week. 1/17/22  Yes Polly Murphy APRN   fexofenadine (ALLEGRA) 60 MG tablet Take 60 mg by mouth Daily.   Yes Brunilda Suresh MD   fluticasone (FLONASE) 50 MCG/ACT nasal spray  11/24/17  Yes Brunilda Suresh MD   multivitamin with minerals tablet tablet  1/1/17  Yes Brunilda Suresh MD   rosuvastatin (CRESTOR) 5 MG tablet Take 10 mg by mouth Daily.   Yes ProviderBrunilda MD   lisinopril (PRINIVIL,ZESTRIL) 2.5 MG tablet TAKE 1 TABLET BY MOUTH EVERY OTHER MORNING 9/24/21 1/19/23  Brunilda Suresh MD   montelukast (SINGULAIR) 10 MG tablet  4/1/20 1/19/23  Brunilda Suresh MD     ________________________________________    The following portions of the patient's history were reviewed and updated as appropriate: allergies, current medications, past family history, past medical history, past social history, past surgical history and problem list.    Review of Systems   Constitutional: Negative.    HENT: Negative.    Eyes: Negative for visual disturbance.   Respiratory: Negative for cough, shortness of breath and wheezing.    Cardiovascular: Negative for chest pain, palpitations and leg swelling.   Gastrointestinal: Negative for abdominal distention, abdominal pain, blood in stool, constipation, diarrhea, nausea and vomiting.   Endocrine: Negative for cold  "intolerance and heat intolerance.   Genitourinary: Negative for difficulty urinating, dyspareunia, dysuria, frequency, genital sores, hematuria, menstrual problem, pelvic pain, urgency, vaginal bleeding, vaginal discharge and vaginal pain.   Musculoskeletal: Negative.    Skin: Negative.    Neurological: Negative for dizziness, weakness, light-headedness, numbness and headaches.   Hematological: Negative.    Psychiatric/Behavioral: Negative.    Breasts: negative for lumps skin changes, dimpling, swelling, nipple changes/discharge bilaterally       Objective   Physical Exam    /78   Ht 152.4 cm (60\")   Wt 63.7 kg (140 lb 6.4 oz)   LMP  (LMP Unknown)   BMI 27.42 kg/m²    BP Readings from Last 3 Encounters:   01/19/23 126/78   01/17/22 118/76   05/07/21 108/67      Wt Readings from Last 3 Encounters:   01/19/23 63.7 kg (140 lb 6.4 oz)   01/17/22 64.1 kg (141 lb 6.4 oz)   05/07/21 62.1 kg (136 lb 14.4 oz)        BMI: Estimated body mass index is 27.42 kg/m² as calculated from the following:    Height as of this encounter: 152.4 cm (60\").    Weight as of this encounter: 63.7 kg (140 lb 6.4 oz).            General:   alert, appears stated age and cooperative   Heart: regular rate and rhythm, S1, S2 normal, no murmur, click, rub or gallop   Lungs: clear to auscultation bilaterally   Abdomen: soft, non-tender, without masses or organomegaly   Breast: inspection negative, no nipple discharge or bleeding, no masses or nodularity palpable   Vulva: External genitalia including bartholin's glands, Urethra, [place]'s gland and urethra meatus are normal, Perineum, rectum and anus appear normal  and Bladder appears normal without significant prolapse    Vagina: normal mucosa, normal discharge   Cervix: absent   Uterus: absent    Adnexa: normal adnexa     Assessment:    Diagnoses and all orders for this visit:    1. Well woman exam with routine gynecological exam (Primary)      Healthy lifestyle modifications discussed, " counseled on self breast exams and bone health        Polly Murphy, APRN  1/19/2023 11:33 EST

## 2024-01-23 ENCOUNTER — TELEPHONE (OUTPATIENT)
Dept: OBSTETRICS AND GYNECOLOGY | Age: 59
End: 2024-01-23
Payer: COMMERCIAL

## 2024-01-23 NOTE — TELEPHONE ENCOUNTER
Pt used to see Polly she's coming for her annual with you 2/5/24, need her vivelle dot refill, please advise. Thanks

## 2024-01-24 RX ORDER — ESTRADIOL 0.05 MG/D
1 PATCH, EXTENDED RELEASE TRANSDERMAL 2 TIMES WEEKLY
Qty: 8 PATCH | Refills: 1 | Status: SHIPPED | OUTPATIENT
Start: 2024-01-25

## 2024-02-05 ENCOUNTER — OFFICE VISIT (OUTPATIENT)
Dept: OBSTETRICS AND GYNECOLOGY | Age: 59
End: 2024-02-05
Payer: COMMERCIAL

## 2024-02-05 VITALS — HEIGHT: 60 IN | BODY MASS INDEX: 29.8 KG/M2 | WEIGHT: 151.8 LBS

## 2024-02-05 DIAGNOSIS — Z90.710 S/P HYSTERECTOMY: ICD-10-CM

## 2024-02-05 DIAGNOSIS — Z01.419 WELL FEMALE EXAM WITH ROUTINE GYNECOLOGICAL EXAM: Primary | ICD-10-CM

## 2024-02-05 DIAGNOSIS — Z12.31 SCREENING MAMMOGRAM FOR BREAST CANCER: ICD-10-CM

## 2024-02-05 DIAGNOSIS — Z63.6 CAREGIVER STRESS: ICD-10-CM

## 2024-02-05 DIAGNOSIS — N95.1 MENOPAUSAL SYMPTOMS: ICD-10-CM

## 2024-02-05 PROCEDURE — 99396 PREV VISIT EST AGE 40-64: CPT

## 2024-02-05 PROCEDURE — 99459 PELVIC EXAMINATION: CPT

## 2024-02-05 RX ORDER — CETIRIZINE HCL 1 MG/ML
SOLUTION, ORAL ORAL
COMMUNITY
Start: 2022-04-01

## 2024-02-05 RX ORDER — ESTRADIOL 0.05 MG/D
1 PATCH, EXTENDED RELEASE TRANSDERMAL 2 TIMES WEEKLY
Qty: 8 PATCH | Refills: 12 | Status: SHIPPED | OUTPATIENT
Start: 2024-02-05

## 2024-02-05 RX ORDER — TRAZODONE HYDROCHLORIDE 50 MG/1
50 TABLET ORAL
COMMUNITY
Start: 2023-10-25 | End: 2024-10-24

## 2024-02-05 RX ORDER — ROSUVASTATIN CALCIUM 10 MG/1
10 TABLET, COATED ORAL DAILY
COMMUNITY

## 2024-02-05 SDOH — SOCIAL STABILITY - SOCIAL INSECURITY: DEPENDENT RELATIVE NEEDING CARE AT HOME: Z63.6

## 2024-02-05 NOTE — PROGRESS NOTES
Subjective     Chief Complaint   Patient presents with    Gynecologic Exam     Annal exam, last pap 22 (-), last mammo 3/18/22 (-), 19, c-scope 21, no complaints       History of Present Illness    Gisela Zee is a 58 y.o.  who presents for annual exam.  Her menses are absent   Caregiver for both  parents  S/p hysterectomy for endometriosis  Watches her 3 grandchildren as well  Doing well on Minivelle  Obstetric History:  OB History          2    Para   2    Term   2            AB        Living   2         SAB        IAB        Ectopic        Molar        Multiple        Live Births   2               Menstrual History:     No LMP recorded (lmp unknown). Patient has had a hysterectomy.         Current contraception: status post hysterectomy  History of abnormal Pap smear: no  Received Gardasil immunization: no  Perform regular self breast exam: yes - monthly  Family history of uterine or ovarian cancer: no  Family History of colon cancer: no  Family history of breast cancer: no    Mammogram: ordered.  Colonoscopy: up to date.  DEXA: not indicated.    Exercise: moderately active  Calcium/Vitamin D: adequate intake and uses supplements    The following portions of the patient's history were reviewed and updated as appropriate: allergies, current medications, past family history, past medical history, past social history, past surgical history, and problem list.    Review of Systems   Constitutional: Negative.    HENT: Negative.     Eyes: Negative.    Respiratory: Negative.     Cardiovascular: Negative.    Gastrointestinal: Negative.    Endocrine: Negative.    Genitourinary: Negative.    Musculoskeletal: Negative.    Skin: Negative.    Allergic/Immunologic: Negative.    Neurological: Negative.    Hematological: Negative.    Psychiatric/Behavioral: Negative.             Objective   Physical Exam  Vitals and nursing note reviewed.   Constitutional:       Appearance: Normal appearance.  "  HENT:      Head: Normocephalic.   Eyes:      Pupils: Pupils are equal, round, and reactive to light.   Cardiovascular:      Rate and Rhythm: Normal rate and regular rhythm.      Pulses: Normal pulses.      Heart sounds: Normal heart sounds.   Pulmonary:      Effort: Pulmonary effort is normal.      Breath sounds: Normal breath sounds.   Chest:   Breasts:     Right: Normal.      Left: Normal.   Abdominal:      General: Abdomen is flat. Bowel sounds are normal.      Palpations: Abdomen is soft.   Genitourinary:     General: Normal vulva.      Exam position: Lithotomy position.      Vagina: Normal.      Rectum: Normal.      Comments: Uterus and cervix surgically removed  Musculoskeletal:         General: Normal range of motion.      Cervical back: Full passive range of motion without pain and normal range of motion.      Right lower leg: No edema.      Left lower leg: No edema.   Lymphadenopathy:      Head:      Right side of head: No submental or submandibular adenopathy.      Left side of head: No submental or submandibular adenopathy.   Skin:     General: Skin is warm and dry.   Neurological:      General: No focal deficit present.      Mental Status: She is alert.      Gait: Gait is intact.   Psychiatric:         Attention and Perception: Attention normal.         Mood and Affect: Mood normal.         Speech: Speech normal.         Ht 152.4 cm (60\")   Wt 68.9 kg (151 lb 12.8 oz)   LMP  (LMP Unknown)   BMI 29.65 kg/m²     Assessment & Plan   Diagnoses and all orders for this visit:    1. Well female exam with routine gynecological exam (Primary)    2. Screening mammogram for breast cancer  -     Mammo Screening Digital Tomosynthesis Bilateral With CAD; Future    3. Menopausal symptoms  -     estradiol (MINIVELLE, VIVELLE-DOT) 0.05 MG/24HR patch; Place 1 patch on the skin as directed by provider 2 (Two) Times a Week.  Dispense: 8 patch; Refill: 12    4. Caregiver stress    5. S/P hysterectomy      Pap not " indicated we reviewed this together  All questions answered.  Breast self exam technique reviewed and patient encouraged to perform self-exam monthly.  Discussed healthy lifestyle modifications.  Recommended 30 minutes of aerobic exercise five times per week.  Discussed vit d and calcium needs to prevent osteoporosis.   She is taking a centrum silver

## 2024-02-21 ENCOUNTER — HOSPITAL ENCOUNTER (OUTPATIENT)
Dept: MAMMOGRAPHY | Facility: HOSPITAL | Age: 59
Discharge: HOME | End: 2024-02-21
Payer: COMMERCIAL

## 2024-02-21 DIAGNOSIS — Z12.31 SCREENING MAMMOGRAM FOR BREAST CANCER: ICD-10-CM

## 2024-02-21 PROCEDURE — 77067 SCR MAMMO BI INCL CAD: CPT

## 2024-02-21 PROCEDURE — 77063 BREAST TOMOSYNTHESIS BI: CPT

## 2025-02-05 DIAGNOSIS — N95.1 MENOPAUSAL SYMPTOMS: ICD-10-CM

## 2025-02-06 RX ORDER — ESTRADIOL 0.05 MG/D
PATCH, EXTENDED RELEASE TRANSDERMAL
Qty: 8 PATCH | Refills: 0 | Status: SHIPPED | OUTPATIENT
Start: 2025-02-06

## 2025-03-03 DIAGNOSIS — N95.1 MENOPAUSAL SYMPTOMS: ICD-10-CM

## 2025-03-03 RX ORDER — ESTRADIOL 0.05 MG/D
PATCH, EXTENDED RELEASE TRANSDERMAL
Qty: 8 PATCH | Refills: 3 | Status: SHIPPED | OUTPATIENT
Start: 2025-03-03

## 2025-04-04 NOTE — PROGRESS NOTES
[facility]  Routine Annual Visit    2022    Patient: Gisela Zee          MR#:[id number]      History of Present Illness    56 y.o. female  who presents for annual exam with no complaints and no change in medical history    No LMP recorded (lmp unknown). Patient has had a hysterectomy.  Obstetric History:  OB History        2    Para   2    Term   2            AB        Living   2       SAB        IAB        Ectopic        Molar        Multiple        Live Births   2               Menstrual History:     No LMP recorded (lmp unknown). Patient has had a hysterectomy.       Sexual History:       ________________________________________  Patient Active Problem List   Diagnosis   (none) - all problems resolved or deleted       Past Medical History:   Diagnosis Date   • Asthma    • Endometriosis    • GERD (gastroesophageal reflux disease)    • Hyperlipidemia    • Hypertension        Past Surgical History:   Procedure Laterality Date   •  SECTION     • CHOLECYSTECTOMY     • COLON RESECTION      diverticulitis    • COLONOSCOPY     • COLONOSCOPY N/A 2021    Procedure: COLONOSCOPY WITH DIVERTICULOSIS AND POLYPS;  Surgeon: Enrique Lopez MD;  [place] [facility] [place]  Service: Gastroenterology;  Laterality: N/A;   • HERNIA REPAIR     • HYSTERECTOMY     • OOPHORECTOMY         Social History     Tobacco Use   Smoking Status Never Smoker   Smokeless Tobacco Never Used       Family History   Problem Relation Age of Onset   • Hypertension Mother    • Heart disease Mother    • Diabetes Mother    • Cancer Mother    • Cancer Father    • Hypertension Maternal Grandmother    • Heart disease Maternal Grandmother    • Diabetes Maternal Grandmother    • Multiple sclerosis Sister    • No Known Problems Daughter    • No Known Problems Daughter        Prior to Admission medications    Medication Sig Start Date End Date Taking? Authorizing Provider   ALBUTEROL  SULFATE HFA IN  10/9/20  Yes ProviderBrunilda MD   eletriptan (RELPAX) 20 MG tablet Take  by mouth.   Yes Provider, MD Brunilda   estradiol (MINIVELLE, VIVELLE-DOT) 0.05 MG/24HR patch Place 1 patch on the skin as directed by provider 2 (Two) Times a Week. 1/17/22  Yes Polyl Murphy APRN   fexofenadine (ALLEGRA) 60 MG tablet Take 60 mg by mouth Daily.   Yes ProviderBrunilda MD   fluticasone (FLONASE) 50 MCG/ACT nasal spray  11/24/17  Yes Provider, MD Brunilda   lisinopril (PRINIVIL,ZESTRIL) 2.5 MG tablet TAKE 1 TABLET BY MOUTH EVERY OTHER MORNING 9/24/21  Yes ProviderBrunilda MD   montelukast (SINGULAIR) 10 MG tablet  4/1/20  Yes Provider, MD Brunilda   multivitamin with minerals (Multivitamin Adults 50+) tablet tablet  1/1/17  Yes ProviderBrunilda MD   rosuvastatin (CRESTOR) 5 MG tablet Take 5 mg by mouth Daily.   Yes Provider, MD Brunilda   estradiol (MINIVELLE, VIVELLE-DOT) 0.05 MG/24HR patch Place 1 patch on the skin as directed by provider 2 (Two) Times a Week. 1/11/21 1/17/22 Yes Polly Murphy APRN     ________________________________________    The following portions of the patient's history were reviewed and updated as appropriate: allergies, current medications, past family history, past medical history, past social history, past surgical history and problem list.    Review of Systems   Constitutional: Negative.    HENT: Negative.    Eyes: Negative for visual disturbance.   Respiratory: Negative for cough, shortness of breath and wheezing.    Cardiovascular: Negative for chest pain, palpitations and leg swelling.   Gastrointestinal: Negative for abdominal distention, abdominal pain, blood in stool, constipation, diarrhea, nausea and vomiting.   Endocrine: Negative for cold intolerance and heat intolerance.   Genitourinary: Negative for difficulty urinating, dyspareunia, dysuria, frequency, genital sores, hematuria, menstrual problem, pelvic pain, urgency, vaginal bleeding,  "vaginal discharge and vaginal pain.   Musculoskeletal: Negative.    Skin: Negative.    Neurological: Negative for dizziness, weakness, light-headedness, numbness and headaches.   Hematological: Negative.    Psychiatric/Behavioral: Negative.    Breasts: negative for lumps skin changes, dimpling, swelling, nipple changes/discharge bilaterally       Objective   Physical Exam    /76   Ht 152.4 cm (60\")   Wt 64.1 kg (141 lb 6.4 oz)   LMP  (LMP Unknown)   Breastfeeding No   BMI 27.62 kg/m²    BP Readings from Last 3 Encounters:   01/17/22 118/76   05/07/21 108/67   01/11/21 124/76      Wt Readings from Last 3 Encounters:   01/17/22 64.1 kg (141 lb 6.4 oz)   05/07/21 62.1 kg (136 lb 14.4 oz)   01/11/21 63 kg (139 lb)        BMI: Estimated body mass index is 27.62 kg/m² as calculated from the following:    Height as of this encounter: 152.4 cm (60\").    Weight as of this encounter: 64.1 kg (141 lb 6.4 oz).            General:   alert, appears stated age and cooperative   Heart: regular rate and rhythm, S1, S2 normal, no murmur, click, rub or gallop   Lungs: clear to auscultation bilaterally   Abdomen: soft, non-tender, without masses or organomegaly   Breast: inspection negative, no nipple discharge or bleeding, no masses or nodularity palpable   Vulva: External genitalia including bartholin's glands, Urethra, [place]'s gland and urethra meatus are normal, Perineum, rectum and anus appear normal  and Bladder appears normal without significant prolapse    Vagina: normal mucosa, normal discharge   Cervix: absent   Uterus: absent    Adnexa: no mass, fullness, tenderness     Assessment:    Diagnoses and all orders for this visit:    1. Well woman exam with routine gynecological exam (Primary)  -     IgP, Aptima HPV    2. Screening mammogram for breast cancer  -     Mammo Screening Digital Tomosynthesis Bilateral With CAD; Future    Other orders  -     estradiol (MINIVELLE, VIVELLE-DOT) 0.05 MG/24HR patch; Place 1 patch " on the skin as directed by provider 2 (Two) Times a Week.  Dispense: 8 patch; Refill: 12      Patient has been made aware of the black box warming from FDA about heart attacks, strokes, breast cancer, uterine cancer, and VTE risks.      Healthy lifestyle modifications discussed, counseled on self breast exams and bone health        Polly Murphy, APRN  1/17/2022 11:34 EST   Statement Selected

## 2025-05-28 ENCOUNTER — OFFICE VISIT (OUTPATIENT)
Dept: OBSTETRICS AND GYNECOLOGY | Age: 60
End: 2025-05-28
Payer: COMMERCIAL

## 2025-05-28 VITALS
HEIGHT: 60 IN | SYSTOLIC BLOOD PRESSURE: 114 MMHG | DIASTOLIC BLOOD PRESSURE: 62 MMHG | WEIGHT: 160.6 LBS | BODY MASS INDEX: 31.53 KG/M2

## 2025-05-28 DIAGNOSIS — N95.1 MENOPAUSAL SYMPTOMS: ICD-10-CM

## 2025-05-28 DIAGNOSIS — Z12.31 SCREENING MAMMOGRAM FOR BREAST CANCER: ICD-10-CM

## 2025-05-28 DIAGNOSIS — Z90.710 HISTORY OF HYSTERECTOMY: ICD-10-CM

## 2025-05-28 DIAGNOSIS — Z01.419 WELL FEMALE EXAM WITH ROUTINE GYNECOLOGICAL EXAM: Primary | ICD-10-CM

## 2025-05-28 RX ORDER — ESTRADIOL 0.05 MG/D
1 PATCH, EXTENDED RELEASE TRANSDERMAL 2 TIMES WEEKLY
Qty: 8 PATCH | Refills: 11 | Status: SHIPPED | OUTPATIENT
Start: 2025-05-29

## 2025-05-28 RX ORDER — ELETRIPTAN HYDROBROMIDE 40 MG/1
20 TABLET, FILM COATED ORAL EVERY 12 HOURS SCHEDULED
COMMUNITY

## 2025-05-28 NOTE — PROGRESS NOTES
Subjective     History of Present Illness    Chief Complaint   Patient presents with    Gynecologic Exam     CC: annual. Last pap 22 (-). Last mg 24. Colonoscopy 21.       Gisela Zee is a 59 y.o. female  who presents for annual exam.  No vaginal concerns or complaints.   Her mom passed away 2024. She then moved houses and had her dad move in with her full time. Still grieving but also very busy with the moves, was on an SSRI for a little after her mom's passing but has since felt better and stopped taking SSRI. She got behind on her mammogram during this time.   S/p hysterectomy for endometriosis   Estradiol patches for menopausal sx, likes these and requests refills.   Due for screening mammogram, last screening mammo 2024 was negative.   Colonoscopy utd from , polyps so f/u 5 yrs.  DEXA utd, normal in 2019.  Follows with PCP.     Relevant data reviewed:  Mammo Screening Digital Tomosynthesis Bilateral With CAD (2024 08:06)   COLONOSCOPY (2021 14:30)   DEXA bone density (2019 13:41)     Obstetric History:  OB History          2    Para   2    Term   2            AB        Living   2         SAB        IAB        Ectopic        Molar        Multiple        Live Births   2               Menstrual History:     No LMP recorded (lmp unknown). Patient has had a hysterectomy.           Current contraception: status post hysterectomy  History of abnormal Pap smear: no  Received Gardasil immunization: no  Perform regular self breast exam: yes -    Family history of uterine or ovarian cancer: no  Family History of colon cancer: no  Family history of breast cancer: no    PAP: not indicated   Mammogram: ordered  Colonoscopy: up to date - polyps 2021, f/u 5 yrs  DEXA: up to date - normal in 2019    Exercise: moderately active  Calcium/Vitamin D: adequate intake    The following portions of the patient's history were reviewed and updated as appropriate:  "allergies, current medications, past family history, past medical history, past social history, past surgical history, and problem list.    Review of Systems  A comprehensive review of systems was negative.       Objective   Physical Exam    /62   Ht 152 cm (59.84\")   Wt 72.8 kg (160 lb 9.6 oz)   LMP  (LMP Unknown)   BMI 31.53 kg/m²      General: alert, appears stated age, cooperative, and no distress   Heart: regular rate and rhythm, S1, S2 normal, no murmur, click, rub or gallop   Lungs: clear to auscultation bilaterally   Abdomen: soft, non-tender, without masses or organomegaly   Breast: inspection negative, no nipple discharge or bleeding, no masses or nodularity palpable   External genitalia/Vulva: External genitalia including bartholin's glands, Urethra, [place]'s gland and urethra meatus are normal and Bladder appears normal without significant prolapse    Vagina: normal mucosa, normal discharge   Cervix: absent    Uterus: absent    Adnexa: normal adnexa and no mass, fullness, tenderness   Neurologic: Alert and Oriented x3   Psychiatric: Normal affect, judgement, and mood       Assessment & Plan   Diagnoses and all orders for this visit:    1. Well female exam with routine gynecological exam (Primary)    2. Screening mammogram for breast cancer  -     Mammo Screening Digital Tomosynthesis Bilateral With CAD; Future    3. Menopausal symptoms  -     estradiol ([place]) 0.05 MG/24HR patch; Place 1 patch on the skin as directed by provider 2 (Two) Times a Week.  Dispense: 8 patch; Refill: 11    4. History of hysterectomy          Screening mammogram ordered  Estrogen patch refills sent  Will call patient with results and treat accordingly.   All questions answered.  Breast self exam technique reviewed and patient encouraged to perform self-exam monthly.  Physical activity and regular exercise encouraged.  Discussed healthy lifestyle modifications.  Discussed calcium and vitamin D needs to prevent " osteoporosis.      Return in 1 year (on 5/28/2026) for Annual exam.       Marilyn Liu PA-C  5/28/2025 20:07 EDT

## 2025-06-12 ENCOUNTER — HOSPITAL ENCOUNTER (OUTPATIENT)
Dept: MAMMOGRAPHY | Facility: HOSPITAL | Age: 60
Discharge: HOME | End: 2025-06-12
Payer: COMMERCIAL

## 2025-06-12 DIAGNOSIS — Z12.31 SCREENING MAMMOGRAM FOR BREAST CANCER: ICD-10-CM

## 2025-06-12 PROCEDURE — 77063 BREAST TOMOSYNTHESIS BI: CPT

## 2025-06-12 PROCEDURE — 77067 SCR MAMMO BI INCL CAD: CPT

## (undated) DEVICE — TRAP POLYP 4CHAMBER

## (undated) DEVICE — KT ORCA ORCAPOD DISP STRL

## (undated) DEVICE — SYR LUER SLPTP 50ML

## (undated) DEVICE — JACKT LAB F/R KNIT CUFF/COLR XLG BLU

## (undated) DEVICE — SINGLE-USE BIOPSY FORCEPS: Brand: RADIAL JAW 4

## (undated) DEVICE — CANN NASL CO2 TRULINK W/O2 A/

## (undated) DEVICE — Device

## (undated) DEVICE — VIAL FORMLN CAP 10PCT 20ML